# Patient Record
Sex: MALE | Race: BLACK OR AFRICAN AMERICAN | NOT HISPANIC OR LATINO | URBAN - METROPOLITAN AREA
[De-identification: names, ages, dates, MRNs, and addresses within clinical notes are randomized per-mention and may not be internally consistent; named-entity substitution may affect disease eponyms.]

---

## 2017-12-19 ENCOUNTER — EMERGENCY (EMERGENCY)
Facility: HOSPITAL | Age: 22
LOS: 1 days | Discharge: HOME | End: 2017-12-19
Admitting: EMERGENCY MEDICINE
Payer: MEDICAID

## 2017-12-19 VITALS
RESPIRATION RATE: 16 BRPM | HEART RATE: 78 BPM | SYSTOLIC BLOOD PRESSURE: 131 MMHG | TEMPERATURE: 98 F | DIASTOLIC BLOOD PRESSURE: 89 MMHG | OXYGEN SATURATION: 100 %

## 2017-12-19 PROCEDURE — 99284 EMERGENCY DEPT VISIT MOD MDM: CPT | Mod: 25

## 2017-12-19 PROCEDURE — 99053 MED SERV 10PM-8AM 24 HR FAC: CPT

## 2017-12-19 NOTE — ED ADULT TRIAGE NOTE - CHIEF COMPLAINT QUOTE
pt amb to triage c/o "allergy" type symptoms associated w/ tiredness, tonight pt felt very hot and had multiple episodes of emesis, currently having nausea, denies having flu shot this season

## 2017-12-20 VITALS
TEMPERATURE: 98 F | RESPIRATION RATE: 16 BRPM | SYSTOLIC BLOOD PRESSURE: 119 MMHG | DIASTOLIC BLOOD PRESSURE: 70 MMHG | OXYGEN SATURATION: 100 % | HEART RATE: 82 BPM

## 2017-12-20 LAB
ALBUMIN SERPL ELPH-MCNC: 4.9 G/DL — SIGNIFICANT CHANGE UP (ref 3.3–5)
ALP SERPL-CCNC: 57 U/L — SIGNIFICANT CHANGE UP (ref 40–120)
ALT FLD-CCNC: 16 U/L — SIGNIFICANT CHANGE UP (ref 4–41)
AST SERPL-CCNC: 19 U/L — SIGNIFICANT CHANGE UP (ref 4–40)
BASE EXCESS BLDV CALC-SCNC: 1.7 MMOL/L — SIGNIFICANT CHANGE UP
BASOPHILS # BLD AUTO: 0.04 K/UL — SIGNIFICANT CHANGE UP (ref 0–0.2)
BASOPHILS NFR BLD AUTO: 0.6 % — SIGNIFICANT CHANGE UP (ref 0–2)
BILIRUB SERPL-MCNC: 0.4 MG/DL — SIGNIFICANT CHANGE UP (ref 0.2–1.2)
BLOOD GAS VENOUS - CREATININE: 0.83 MG/DL — SIGNIFICANT CHANGE UP (ref 0.5–1.3)
BUN SERPL-MCNC: 12 MG/DL — SIGNIFICANT CHANGE UP (ref 7–23)
CALCIUM SERPL-MCNC: 9.6 MG/DL — SIGNIFICANT CHANGE UP (ref 8.4–10.5)
CHLORIDE BLDV-SCNC: 106 MMOL/L — SIGNIFICANT CHANGE UP (ref 96–108)
CHLORIDE SERPL-SCNC: 103 MMOL/L — SIGNIFICANT CHANGE UP (ref 98–107)
CO2 SERPL-SCNC: 27 MMOL/L — SIGNIFICANT CHANGE UP (ref 22–31)
CREAT SERPL-MCNC: 1.01 MG/DL — SIGNIFICANT CHANGE UP (ref 0.5–1.3)
EOSINOPHIL # BLD AUTO: 0.01 K/UL — SIGNIFICANT CHANGE UP (ref 0–0.5)
EOSINOPHIL NFR BLD AUTO: 0.1 % — SIGNIFICANT CHANGE UP (ref 0–6)
GAS PNL BLDV: 139 MMOL/L — SIGNIFICANT CHANGE UP (ref 136–146)
GLUCOSE BLDV-MCNC: 129 — HIGH (ref 70–99)
GLUCOSE SERPL-MCNC: 126 MG/DL — HIGH (ref 70–99)
HCO3 BLDV-SCNC: 24 MMOL/L — SIGNIFICANT CHANGE UP (ref 20–27)
HCT VFR BLD CALC: 44.3 % — SIGNIFICANT CHANGE UP (ref 39–50)
HCT VFR BLDV CALC: 47.7 % — SIGNIFICANT CHANGE UP (ref 39–51)
HGB BLD-MCNC: 14.8 G/DL — SIGNIFICANT CHANGE UP (ref 13–17)
HGB BLDV-MCNC: 15.6 G/DL — SIGNIFICANT CHANGE UP (ref 13–17)
IMM GRANULOCYTES # BLD AUTO: 0.01 # — SIGNIFICANT CHANGE UP
IMM GRANULOCYTES NFR BLD AUTO: 0.1 % — SIGNIFICANT CHANGE UP (ref 0–1.5)
LACTATE BLDV-MCNC: 1.4 MMOL/L — SIGNIFICANT CHANGE UP (ref 0.5–2)
LIDOCAIN IGE QN: 32.4 U/L — SIGNIFICANT CHANGE UP (ref 7–60)
LYMPHOCYTES # BLD AUTO: 1.91 K/UL — SIGNIFICANT CHANGE UP (ref 1–3.3)
LYMPHOCYTES # BLD AUTO: 27.8 % — SIGNIFICANT CHANGE UP (ref 13–44)
MCHC RBC-ENTMCNC: 27.6 PG — SIGNIFICANT CHANGE UP (ref 27–34)
MCHC RBC-ENTMCNC: 33.4 % — SIGNIFICANT CHANGE UP (ref 32–36)
MCV RBC AUTO: 82.6 FL — SIGNIFICANT CHANGE UP (ref 80–100)
MONOCYTES # BLD AUTO: 0.39 K/UL — SIGNIFICANT CHANGE UP (ref 0–0.9)
MONOCYTES NFR BLD AUTO: 5.7 % — SIGNIFICANT CHANGE UP (ref 2–14)
NEUTROPHILS # BLD AUTO: 4.51 K/UL — SIGNIFICANT CHANGE UP (ref 1.8–7.4)
NEUTROPHILS NFR BLD AUTO: 65.7 % — SIGNIFICANT CHANGE UP (ref 43–77)
NRBC # FLD: 0 — SIGNIFICANT CHANGE UP
PCO2 BLDV: 52 MMHG — HIGH (ref 41–51)
PH BLDV: 7.34 PH — SIGNIFICANT CHANGE UP (ref 7.32–7.43)
PLATELET # BLD AUTO: 246 K/UL — SIGNIFICANT CHANGE UP (ref 150–400)
PMV BLD: 9.9 FL — SIGNIFICANT CHANGE UP (ref 7–13)
PO2 BLDV: 37 MMHG — SIGNIFICANT CHANGE UP (ref 35–40)
POTASSIUM BLDV-SCNC: 3.6 MMOL/L — SIGNIFICANT CHANGE UP (ref 3.4–4.5)
POTASSIUM SERPL-MCNC: 3.5 MMOL/L — SIGNIFICANT CHANGE UP (ref 3.5–5.3)
POTASSIUM SERPL-SCNC: 3.5 MMOL/L — SIGNIFICANT CHANGE UP (ref 3.5–5.3)
PROT SERPL-MCNC: 7.7 G/DL — SIGNIFICANT CHANGE UP (ref 6–8.3)
RBC # BLD: 5.36 M/UL — SIGNIFICANT CHANGE UP (ref 4.2–5.8)
RBC # FLD: 12 % — SIGNIFICANT CHANGE UP (ref 10.3–14.5)
SAO2 % BLDV: 62.5 % — SIGNIFICANT CHANGE UP (ref 60–85)
SODIUM SERPL-SCNC: 141 MMOL/L — SIGNIFICANT CHANGE UP (ref 135–145)
WBC # BLD: 6.87 K/UL — SIGNIFICANT CHANGE UP (ref 3.8–10.5)
WBC # FLD AUTO: 6.87 K/UL — SIGNIFICANT CHANGE UP (ref 3.8–10.5)

## 2017-12-20 PROCEDURE — 71020: CPT | Mod: 26

## 2017-12-20 RX ORDER — SODIUM CHLORIDE 9 MG/ML
1000 INJECTION INTRAMUSCULAR; INTRAVENOUS; SUBCUTANEOUS ONCE
Qty: 0 | Refills: 0 | Status: COMPLETED | OUTPATIENT
Start: 2017-12-20 | End: 2017-12-20

## 2017-12-20 RX ADMIN — SODIUM CHLORIDE 1000 MILLILITER(S): 9 INJECTION INTRAMUSCULAR; INTRAVENOUS; SUBCUTANEOUS at 01:07

## 2017-12-20 NOTE — ED PROVIDER NOTE - NS ED MD DISPO DISCHARGE CCDA
Ondansetron 4 mg      Last Written Prescription Date: 6/21/17  Last Fill Quantity: 30,  # refills: 3   Last Office Visit with G, P or [facility] prescribing provider: 5/17/17                                               
Refill approved through [facility] protocol.  Dotty Prather RN  [facility]  [phone]    
Patient/Caregiver provided printed discharge information.

## 2017-12-20 NOTE — ED PROVIDER NOTE - PLAN OF CARE
Follow up with your Doctor in 1-2 days.  Rest.  Drink plenty of fluids.  Take Tylenol 650mg orally every 6 hours as needed for fever/pain.  Return to the ER for any persistent/worsening or new symptoms weakness, dizziness, chest pain, shortness of breath, abdominal pain or any concerning symptoms.

## 2017-12-20 NOTE — ED PROVIDER NOTE - OBJECTIVE STATEMENT
21 y/o male with no significant PMHx presents to the ER c/o 2 days of cough, congestion and subjective fevers.  Pt reports 1 episode of vomiting today.  Pt denies abdominal pain, back pain, chest pain, shortness of breath.  No recent travel.  Pt offered and declined translation services pt requesting his brother in law Jaquan Dong to translate.

## 2017-12-20 NOTE — ED PROVIDER NOTE - CARE PLAN
Principal Discharge DX:	Viral syndrome  Instructions for follow-up, activity and diet:	Follow up with your Doctor in 1-2 days.  Rest.  Drink plenty of fluids.  Take Tylenol 650mg orally every 6 hours as needed for fever/pain.  Return to the ER for any persistent/worsening or new symptoms weakness, dizziness, chest pain, shortness of breath, abdominal pain or any concerning symptoms.

## 2017-12-20 NOTE — ED PROVIDER NOTE - MEDICAL DECISION MAKING DETAILS
23 y/o male with no significant PMHx presents to the ER c/o 2 days of cough, congestion and subjective fevers, 1 episode of vomiting today, pt is well appearing NAD likely viral URI, abdomen is soft non tender, will check labs, CXR, IVF, reassess.

## 2018-06-21 ENCOUNTER — EMERGENCY (EMERGENCY)
Facility: HOSPITAL | Age: 23
LOS: 1 days | Discharge: HOME | End: 2018-06-21
Admitting: EMERGENCY MEDICINE
Payer: MEDICAID

## 2018-06-21 VITALS
HEART RATE: 78 BPM | RESPIRATION RATE: 100 BRPM | TEMPERATURE: 99 F | SYSTOLIC BLOOD PRESSURE: 138 MMHG | DIASTOLIC BLOOD PRESSURE: 80 MMHG | OXYGEN SATURATION: 100 %

## 2018-06-21 PROCEDURE — 99283 EMERGENCY DEPT VISIT LOW MDM: CPT | Mod: 25

## 2018-06-21 NOTE — ED ADULT TRIAGE NOTE - CHIEF COMPLAINT QUOTE
Pt primarily [place] speaking.  Prefers brother in law to translate.   P/w sores to mouth and pain both ears x3 weeks.  denies difficulty swallowing.  Denies fever/chills, headache, dizziness, weakness.  offers no other complaints.

## 2018-06-21 NOTE — ED PROVIDER NOTE - PHYSICAL EXAMINATION
no oral lesions noted, no posterior pharyngeal erythema, no tonsillar enlargement or exudates, uvula midline   no lesions on palms/soles

## 2018-06-21 NOTE — ED PROVIDER NOTE - OBJECTIVE STATEMENT
21 y/o M pt with no sig PMHx, BIB father, arrives to the ED c/o 4/10 painful radiating (b/l ears) mouth/throat lesions for 3 weeks. As per father, 3 weeks ago, pt accidentally ate a chicken bone, which scratched pt's throat and lead to bleeding in this throat. As per father, pt notes no recent oral sex, or any sexual activity. Denies fever, chills, weight loss, drooling, vocal changes, or any other complaints. No daily meds. NKDA. 23 y/o M pt with no sig PMHx, BIB family member, arrives to the ED c/o 4/10 painful oral lesion/irritation x , 3 weeks ago, after he  accidentally ate a chicken bone, which scratched pt's throat and lead to bleeding in this throat. Pt notes no recent oral sex, or any risky sexual activity. Denies fever, chills, weight loss, drooling, trismus, change changes, or any other complaints. No daily meds. NKDA. Pt is Wol[place] speaking, translation services offered, but pt chose that family member translates; (-) HA, weight loss, hx of immunosuppressed state.

## 2018-06-21 NOTE — ED PROVIDER NOTE - MEDICAL DECISION MAKING DETAILS
oral lesion/irritation, absolutely no vesicles/ ulcerations,/abscess/collection noted on the exam; pt reassured, will give peridex lotion, ent f/u.

## 2018-06-21 NOTE — ED PROVIDER NOTE - SKIN, MLM
medically stable with no acute issues
medically stable with no acute issues
Skin normal color for race, warm, dry and intact. No evidence of rash.

## 2018-06-22 RX ORDER — CHLORHEXIDINE GLUCONATE 213 G/1000ML
15 SOLUTION TOPICAL
Qty: 100 | Refills: 0 | OUTPATIENT
Start: 2018-06-22 | End: 2018-06-24

## 2018-12-24 ENCOUNTER — EMERGENCY (EMERGENCY)
Facility: HOSPITAL | Age: 23
LOS: 1 days | Discharge: HOME | End: 2018-12-24
Attending: EMERGENCY MEDICINE | Admitting: EMERGENCY MEDICINE
Payer: MEDICAID

## 2018-12-24 VITALS
TEMPERATURE: 98 F | SYSTOLIC BLOOD PRESSURE: 140 MMHG | OXYGEN SATURATION: 100 % | HEART RATE: 78 BPM | RESPIRATION RATE: 16 BRPM | DIASTOLIC BLOOD PRESSURE: 88 MMHG

## 2018-12-24 PROCEDURE — 99283 EMERGENCY DEPT VISIT LOW MDM: CPT

## 2018-12-24 NOTE — ED PROVIDER NOTE - MEDICAL DECISION MAKING DETAILS
pj: 9 months of recurrent pain throat; no relief from 5 rounds of antibiotics; nl ctt. was to have tonsils removed at [place] but lost to f/u. exam: large tonsils. no purulence. refer to ENT

## 2018-12-24 NOTE — ED PROVIDER NOTE - OBJECTIVE STATEMENT
pj: 9 months of recurrent pain throat; no relief from 5 rounds of antibiotics; last round of antibiotics was 5 days ago. nl ct neck october 2018 (report reviewed). no diff breathing or eating. was to have tonsils removed at [place] but lost to f/u.   pmh unremarkable

## 2018-12-24 NOTE — ED PROVIDER NOTE - PHYSICAL EXAMINATION
exam: nad. no diff speaking. swallows saliva w/o diff.   nl neck exam.  throat: tonsils reddened and moderately enlarged. no purulence.   lungs clear.

## 2018-12-24 NOTE — ED ADULT TRIAGE NOTE - CHIEF COMPLAINT QUOTE
c/o pain to B/L tonsils and upper gums x 9 months, saw PMD some time ago, and prescribed antibiotics. States symptoms worsening.  Denies any fever.